# Patient Record
Sex: MALE | Race: WHITE | NOT HISPANIC OR LATINO | Employment: OTHER | URBAN - NONMETROPOLITAN AREA
[De-identification: names, ages, dates, MRNs, and addresses within clinical notes are randomized per-mention and may not be internally consistent; named-entity substitution may affect disease eponyms.]

---

## 2021-10-03 ENCOUNTER — APPOINTMENT (OUTPATIENT)
Dept: CT IMAGING | Facility: HOSPITAL | Age: 61
End: 2021-10-03

## 2021-10-03 ENCOUNTER — APPOINTMENT (OUTPATIENT)
Dept: GENERAL RADIOLOGY | Facility: HOSPITAL | Age: 61
End: 2021-10-03

## 2021-10-03 ENCOUNTER — HOSPITAL ENCOUNTER (OUTPATIENT)
Facility: HOSPITAL | Age: 61
Setting detail: OBSERVATION
Discharge: LEFT BEFORE DISCHARGE | End: 2021-10-04
Attending: FAMILY MEDICINE | Admitting: INTERNAL MEDICINE

## 2021-10-03 DIAGNOSIS — G45.9 TIA (TRANSIENT ISCHEMIC ATTACK): ICD-10-CM

## 2021-10-03 DIAGNOSIS — I95.89 HYPOTENSION DUE TO HYPOVOLEMIA: Primary | ICD-10-CM

## 2021-10-03 DIAGNOSIS — E86.1 HYPOTENSION DUE TO HYPOVOLEMIA: Primary | ICD-10-CM

## 2021-10-03 LAB
ALBUMIN SERPL-MCNC: 4.2 G/DL (ref 3.5–5.2)
ALBUMIN/GLOB SERPL: 1.4 G/DL
ALP SERPL-CCNC: 65 U/L (ref 39–117)
ALT SERPL W P-5'-P-CCNC: 32 U/L (ref 1–41)
AMPHET+METHAMPHET UR QL: NEGATIVE
AMPHETAMINES UR QL: POSITIVE
ANION GAP SERPL CALCULATED.3IONS-SCNC: 11 MMOL/L (ref 5–15)
AST SERPL-CCNC: 39 U/L (ref 1–40)
BARBITURATES UR QL SCN: NEGATIVE
BASOPHILS # BLD AUTO: 0.04 10*3/MM3 (ref 0–0.2)
BASOPHILS NFR BLD AUTO: 0.4 % (ref 0–1.5)
BENZODIAZ UR QL SCN: NEGATIVE
BILIRUB SERPL-MCNC: 0.5 MG/DL (ref 0–1.2)
BILIRUB UR QL STRIP: NEGATIVE
BUN SERPL-MCNC: 26 MG/DL (ref 8–23)
BUN/CREAT SERPL: 17.6 (ref 7–25)
BUPRENORPHINE SERPL-MCNC: NEGATIVE NG/ML
CALCIUM SPEC-SCNC: 10 MG/DL (ref 8.6–10.5)
CANNABINOIDS SERPL QL: POSITIVE
CHLORIDE SERPL-SCNC: 102 MMOL/L (ref 98–107)
CHOLEST SERPL-MCNC: 144 MG/DL (ref 0–200)
CLARITY UR: CLEAR
CO2 SERPL-SCNC: 24 MMOL/L (ref 22–29)
COCAINE UR QL: NEGATIVE
COLOR UR: YELLOW
CREAT SERPL-MCNC: 1.48 MG/DL (ref 0.76–1.27)
DEPRECATED RDW RBC AUTO: 54.4 FL (ref 37–54)
EOSINOPHIL # BLD AUTO: 0.23 10*3/MM3 (ref 0–0.4)
EOSINOPHIL NFR BLD AUTO: 2.1 % (ref 0.3–6.2)
ERYTHROCYTE [DISTWIDTH] IN BLOOD BY AUTOMATED COUNT: 16.9 % (ref 12.3–15.4)
ETHANOL BLD-MCNC: <10 MG/DL (ref 0–10)
ETHANOL UR QL: <0.01 %
FLUAV SUBTYP SPEC NAA+PROBE: NOT DETECTED
FLUBV RNA ISLT QL NAA+PROBE: NOT DETECTED
GFR SERPL CREATININE-BSD FRML MDRD: 48 ML/MIN/1.73
GLOBULIN UR ELPH-MCNC: 3 GM/DL
GLUCOSE SERPL-MCNC: 106 MG/DL (ref 65–99)
GLUCOSE UR STRIP-MCNC: NEGATIVE MG/DL
HCT VFR BLD AUTO: 37.8 % (ref 37.5–51)
HDLC SERPL-MCNC: 39 MG/DL (ref 40–60)
HGB BLD-MCNC: 12.5 G/DL (ref 13–17.7)
HGB UR QL STRIP.AUTO: NEGATIVE
HOLD SPECIMEN: NORMAL
HOLD SPECIMEN: NORMAL
IMM GRANULOCYTES # BLD AUTO: 0.19 10*3/MM3 (ref 0–0.05)
IMM GRANULOCYTES NFR BLD AUTO: 1.7 % (ref 0–0.5)
INR PPP: 1.13 (ref 0.8–1.2)
KETONES UR QL STRIP: NEGATIVE
LDLC SERPL CALC-MCNC: 91 MG/DL (ref 0–100)
LDLC/HDLC SERPL: 2.35 {RATIO}
LEUKOCYTE ESTERASE UR QL STRIP.AUTO: NEGATIVE
LYMPHOCYTES # BLD AUTO: 1.09 10*3/MM3 (ref 0.7–3.1)
LYMPHOCYTES NFR BLD AUTO: 9.7 % (ref 19.6–45.3)
MCH RBC QN AUTO: 29 PG (ref 26.6–33)
MCHC RBC AUTO-ENTMCNC: 33.1 G/DL (ref 31.5–35.7)
MCV RBC AUTO: 87.7 FL (ref 79–97)
METHADONE UR QL SCN: NEGATIVE
MONOCYTES # BLD AUTO: 1.1 10*3/MM3 (ref 0.1–0.9)
MONOCYTES NFR BLD AUTO: 9.8 % (ref 5–12)
NEUTROPHILS NFR BLD AUTO: 76.3 % (ref 42.7–76)
NEUTROPHILS NFR BLD AUTO: 8.54 10*3/MM3 (ref 1.7–7)
NITRITE UR QL STRIP: NEGATIVE
NRBC BLD AUTO-RTO: 0 /100 WBC (ref 0–0.2)
OPIATES UR QL: NEGATIVE
OXYCODONE UR QL SCN: NEGATIVE
PCP UR QL SCN: NEGATIVE
PH UR STRIP.AUTO: 6.5 [PH] (ref 5–9)
PLATELET # BLD AUTO: 229 10*3/MM3 (ref 140–450)
PMV BLD AUTO: 9 FL (ref 6–12)
POTASSIUM SERPL-SCNC: 4.3 MMOL/L (ref 3.5–5.2)
PROPOXYPH UR QL: NEGATIVE
PROT SERPL-MCNC: 7.2 G/DL (ref 6–8.5)
PROT UR QL STRIP: NEGATIVE
PROTHROMBIN TIME: 14.4 SECONDS (ref 11.1–15.3)
RBC # BLD AUTO: 4.31 10*6/MM3 (ref 4.14–5.8)
SARS-COV-2 RNA PNL SPEC NAA+PROBE: NOT DETECTED
SODIUM SERPL-SCNC: 137 MMOL/L (ref 136–145)
SP GR UR STRIP: 1.02 (ref 1–1.03)
TRICYCLICS UR QL SCN: NEGATIVE
TRIGL SERPL-MCNC: 67 MG/DL (ref 0–150)
UROBILINOGEN UR QL STRIP: NORMAL
VLDLC SERPL-MCNC: 14 MG/DL (ref 5–40)
WBC # BLD AUTO: 11.19 10*3/MM3 (ref 3.4–10.8)
WHOLE BLOOD HOLD SPECIMEN: NORMAL
WHOLE BLOOD HOLD SPECIMEN: NORMAL

## 2021-10-03 PROCEDURE — 71045 X-RAY EXAM CHEST 1 VIEW: CPT

## 2021-10-03 PROCEDURE — G0378 HOSPITAL OBSERVATION PER HR: HCPCS

## 2021-10-03 PROCEDURE — 82077 ASSAY SPEC XCP UR&BREATH IA: CPT | Performed by: FAMILY MEDICINE

## 2021-10-03 PROCEDURE — 96361 HYDRATE IV INFUSION ADD-ON: CPT

## 2021-10-03 PROCEDURE — 96360 HYDRATION IV INFUSION INIT: CPT

## 2021-10-03 PROCEDURE — 0 IOPAMIDOL PER 1 ML: Performed by: FAMILY MEDICINE

## 2021-10-03 PROCEDURE — C9803 HOPD COVID-19 SPEC COLLECT: HCPCS

## 2021-10-03 PROCEDURE — 93010 ELECTROCARDIOGRAM REPORT: CPT | Performed by: INTERNAL MEDICINE

## 2021-10-03 PROCEDURE — 80306 DRUG TEST PRSMV INSTRMNT: CPT | Performed by: FAMILY MEDICINE

## 2021-10-03 PROCEDURE — 70498 CT ANGIOGRAPHY NECK: CPT

## 2021-10-03 PROCEDURE — 70450 CT HEAD/BRAIN W/O DYE: CPT

## 2021-10-03 PROCEDURE — 83036 HEMOGLOBIN GLYCOSYLATED A1C: CPT | Performed by: NURSE PRACTITIONER

## 2021-10-03 PROCEDURE — 80061 LIPID PANEL: CPT | Performed by: INTERNAL MEDICINE

## 2021-10-03 PROCEDURE — 96372 THER/PROPH/DIAG INJ SC/IM: CPT

## 2021-10-03 PROCEDURE — 87636 SARSCOV2 & INF A&B AMP PRB: CPT | Performed by: FAMILY MEDICINE

## 2021-10-03 PROCEDURE — 85025 COMPLETE CBC W/AUTO DIFF WBC: CPT | Performed by: FAMILY MEDICINE

## 2021-10-03 PROCEDURE — 99284 EMERGENCY DEPT VISIT MOD MDM: CPT

## 2021-10-03 PROCEDURE — 93005 ELECTROCARDIOGRAM TRACING: CPT | Performed by: FAMILY MEDICINE

## 2021-10-03 PROCEDURE — 25010000002 HEPARIN (PORCINE) PER 1000 UNITS: Performed by: INTERNAL MEDICINE

## 2021-10-03 PROCEDURE — 85610 PROTHROMBIN TIME: CPT | Performed by: FAMILY MEDICINE

## 2021-10-03 PROCEDURE — 80053 COMPREHEN METABOLIC PANEL: CPT | Performed by: FAMILY MEDICINE

## 2021-10-03 PROCEDURE — 70496 CT ANGIOGRAPHY HEAD: CPT

## 2021-10-03 PROCEDURE — 81003 URINALYSIS AUTO W/O SCOPE: CPT | Performed by: FAMILY MEDICINE

## 2021-10-03 RX ORDER — SODIUM CHLORIDE 0.9 % (FLUSH) 0.9 %
10 SYRINGE (ML) INJECTION EVERY 12 HOURS SCHEDULED
Status: DISCONTINUED | OUTPATIENT
Start: 2021-10-03 | End: 2021-10-04 | Stop reason: HOSPADM

## 2021-10-03 RX ORDER — SODIUM CHLORIDE 0.9 % (FLUSH) 0.9 %
10 SYRINGE (ML) INJECTION AS NEEDED
Status: DISCONTINUED | OUTPATIENT
Start: 2021-10-03 | End: 2021-10-04 | Stop reason: HOSPADM

## 2021-10-03 RX ORDER — ATORVASTATIN CALCIUM 10 MG/1
10 TABLET, FILM COATED ORAL NIGHTLY
COMMUNITY

## 2021-10-03 RX ORDER — ASPIRIN 81 MG/1
324 TABLET, CHEWABLE ORAL DAILY
Status: DISCONTINUED | OUTPATIENT
Start: 2021-10-04 | End: 2021-10-03

## 2021-10-03 RX ORDER — BUPROPION HYDROCHLORIDE 150 MG/1
150 TABLET, EXTENDED RELEASE ORAL 2 TIMES DAILY
Status: DISCONTINUED | OUTPATIENT
Start: 2021-10-04 | End: 2021-10-04 | Stop reason: HOSPADM

## 2021-10-03 RX ORDER — ONDANSETRON 2 MG/ML
4 INJECTION INTRAMUSCULAR; INTRAVENOUS EVERY 6 HOURS PRN
Status: DISCONTINUED | OUTPATIENT
Start: 2021-10-03 | End: 2021-10-04 | Stop reason: HOSPADM

## 2021-10-03 RX ORDER — ASPIRIN 81 MG/1
81 TABLET, CHEWABLE ORAL DAILY
COMMUNITY

## 2021-10-03 RX ORDER — SODIUM CHLORIDE 9 MG/ML
INJECTION, SOLUTION INTRAVENOUS
Status: COMPLETED
Start: 2021-10-03 | End: 2021-10-03

## 2021-10-03 RX ORDER — CLOPIDOGREL BISULFATE 75 MG/1
75 TABLET ORAL DAILY
Status: DISCONTINUED | OUTPATIENT
Start: 2021-10-04 | End: 2021-10-04 | Stop reason: HOSPADM

## 2021-10-03 RX ORDER — IPRATROPIUM BROMIDE AND ALBUTEROL SULFATE 2.5; .5 MG/3ML; MG/3ML
3 SOLUTION RESPIRATORY (INHALATION) EVERY 6 HOURS PRN
Status: DISCONTINUED | OUTPATIENT
Start: 2021-10-03 | End: 2021-10-04 | Stop reason: HOSPADM

## 2021-10-03 RX ORDER — ACETAMINOPHEN 650 MG/1
650 SUPPOSITORY RECTAL EVERY 4 HOURS PRN
Status: DISCONTINUED | OUTPATIENT
Start: 2021-10-03 | End: 2021-10-04 | Stop reason: HOSPADM

## 2021-10-03 RX ORDER — HEPARIN SODIUM 5000 [USP'U]/ML
5000 INJECTION, SOLUTION INTRAVENOUS; SUBCUTANEOUS EVERY 12 HOURS SCHEDULED
Status: DISCONTINUED | OUTPATIENT
Start: 2021-10-03 | End: 2021-10-04 | Stop reason: HOSPADM

## 2021-10-03 RX ORDER — ATORVASTATIN CALCIUM 40 MG/1
40 TABLET, FILM COATED ORAL NIGHTLY
Status: DISCONTINUED | OUTPATIENT
Start: 2021-10-04 | End: 2021-10-04 | Stop reason: HOSPADM

## 2021-10-03 RX ORDER — CLOPIDOGREL BISULFATE 75 MG/1
75 TABLET ORAL DAILY
COMMUNITY

## 2021-10-03 RX ORDER — BUPROPION HYDROCHLORIDE 150 MG/1
150 TABLET, EXTENDED RELEASE ORAL 2 TIMES DAILY
COMMUNITY

## 2021-10-03 RX ORDER — GABAPENTIN 300 MG/1
300 CAPSULE ORAL 3 TIMES DAILY
COMMUNITY

## 2021-10-03 RX ORDER — ONDANSETRON 4 MG/1
4 TABLET, FILM COATED ORAL EVERY 6 HOURS PRN
Status: DISCONTINUED | OUTPATIENT
Start: 2021-10-03 | End: 2021-10-04 | Stop reason: HOSPADM

## 2021-10-03 RX ORDER — GABAPENTIN 300 MG/1
300 CAPSULE ORAL 3 TIMES DAILY
Status: DISCONTINUED | OUTPATIENT
Start: 2021-10-04 | End: 2021-10-04 | Stop reason: HOSPADM

## 2021-10-03 RX ORDER — ASPIRIN 81 MG/1
81 TABLET, CHEWABLE ORAL DAILY
Status: DISCONTINUED | OUTPATIENT
Start: 2021-10-04 | End: 2021-10-04 | Stop reason: HOSPADM

## 2021-10-03 RX ORDER — ACETAMINOPHEN 160 MG/5ML
650 SOLUTION ORAL EVERY 4 HOURS PRN
Status: DISCONTINUED | OUTPATIENT
Start: 2021-10-03 | End: 2021-10-04 | Stop reason: HOSPADM

## 2021-10-03 RX ORDER — LISINOPRIL 10 MG/1
10 TABLET ORAL DAILY
COMMUNITY

## 2021-10-03 RX ORDER — SODIUM CHLORIDE 9 MG/ML
100 INJECTION, SOLUTION INTRAVENOUS CONTINUOUS
Status: DISCONTINUED | OUTPATIENT
Start: 2021-10-03 | End: 2021-10-04 | Stop reason: HOSPADM

## 2021-10-03 RX ORDER — ACETAMINOPHEN 325 MG/1
650 TABLET ORAL EVERY 4 HOURS PRN
Status: DISCONTINUED | OUTPATIENT
Start: 2021-10-03 | End: 2021-10-04 | Stop reason: HOSPADM

## 2021-10-03 RX ADMIN — SODIUM CHLORIDE 1000 ML: 900 INJECTION, SOLUTION INTRAVENOUS at 16:41

## 2021-10-03 RX ADMIN — SODIUM CHLORIDE 100 ML/HR: 9 INJECTION, SOLUTION INTRAVENOUS at 20:27

## 2021-10-03 RX ADMIN — IOPAMIDOL 90 ML: 755 INJECTION, SOLUTION INTRAVENOUS at 16:52

## 2021-10-03 RX ADMIN — Medication 10 ML: at 20:21

## 2021-10-03 RX ADMIN — HEPARIN SODIUM 5000 UNITS: 5000 INJECTION INTRAVENOUS; SUBCUTANEOUS at 20:28

## 2021-10-03 NOTE — ED PROVIDER NOTES
Subjective     History provided by:  Patient   used: No    Patient is a 60 years old with past medical history of TIA.  He said that he had aTIA about a month ago.  Presented here today because of slurry speech.  Were not sure the last time known well.  Denies any extremity weakness.    Review of Systems   All other systems reviewed and are negative.      No past medical history on file.    No Known Allergies    No past surgical history on file.    No family history on file.    Social History     Socioeconomic History   • Marital status:      Spouse name: Not on file   • Number of children: Not on file   • Years of education: Not on file   • Highest education level: Not on file           Objective   Physical Exam  Vitals and nursing note reviewed.   Constitutional:       Appearance: Normal appearance. He is normal weight.   HENT:      Head: Normocephalic and atraumatic.      Right Ear: Tympanic membrane and ear canal normal.      Left Ear: Tympanic membrane, ear canal and external ear normal.      Nose: Nose normal.   Eyes:      Extraocular Movements: Extraocular movements intact.      Conjunctiva/sclera: Conjunctivae normal.      Pupils: Pupils are equal, round, and reactive to light.   Cardiovascular:      Rate and Rhythm: Normal rate and regular rhythm.      Pulses: Normal pulses.      Heart sounds: Normal heart sounds.   Pulmonary:      Breath sounds: Normal breath sounds.   Abdominal:      General: Abdomen is flat. Bowel sounds are normal.      Palpations: Abdomen is soft.   Musculoskeletal:         General: Normal range of motion.      Cervical back: Normal range of motion and neck supple.   Skin:     General: Skin is warm.      Capillary Refill: Capillary refill takes less than 2 seconds.   Neurological:      General: No focal deficit present.      Mental Status: He is alert and oriented to person, place, and time.      GCS: GCS eye subscore is 4. GCS verbal subscore is 5. GCS motor  subscore is 6.      Coordination: Coordination is intact.      Gait: Gait is intact.      Comments: Right facial weakness   Psychiatric:         Mood and Affect: Mood normal.         Behavior: Behavior normal.         Procedures           ED Course  ED Course as of Oct 03 1834   Sun Oct 03, 2021   1710 Spoke to Dr. Mohsen said that he will consult on patient.    [MO]   1833 Spoke to Dr. johnson who accepted patient.    [MO]   1834 All the symptom has resolved.    [MO]      ED Course User Index  [MO] Abdoulaye Mallory MD                                         NIHSS (NIH Stroke Scale/Score) reviewed and/or performed as part of the patient evaluation and treatment planning process.  The result associated with this review/performance is: 3       MDM    Final diagnoses:   Hypotension due to hypovolemia   TIA (transient ischemic attack)       ED Disposition  ED Disposition     ED Disposition Condition Comment    Decision to Admit  Level of Care: Stepdown [25]   Diagnosis: Hypotension due to hypovolemia [5139820]   Admitting Physician: ALLEGRA JOHNSON [[id number]]   Attending Physician: ALLEGRA JOHNSON [[id number]]            No follow-up provider specified.       Medication List      No changes were made to your prescriptions during this visit.          Abdoulaye Mallory MD  10/03/21 3023

## 2021-10-04 ENCOUNTER — APPOINTMENT (OUTPATIENT)
Dept: MRI IMAGING | Facility: HOSPITAL | Age: 61
End: 2021-10-04

## 2021-10-04 VITALS
BODY MASS INDEX: 19.26 KG/M2 | DIASTOLIC BLOOD PRESSURE: 86 MMHG | HEART RATE: 70 BPM | HEIGHT: 68 IN | SYSTOLIC BLOOD PRESSURE: 133 MMHG | TEMPERATURE: 97.3 F | OXYGEN SATURATION: 96 % | WEIGHT: 127.1 LBS | RESPIRATION RATE: 18 BRPM

## 2021-10-04 LAB
HBA1C MFR BLD: 4.7 % (ref 4.8–5.6)
QT INTERVAL: 410 MS
QTC INTERVAL: 470 MS

## 2021-10-04 PROCEDURE — G0378 HOSPITAL OBSERVATION PER HR: HCPCS

## 2021-10-04 PROCEDURE — 96361 HYDRATE IV INFUSION ADD-ON: CPT

## 2021-10-04 NOTE — DISCHARGE SUMMARY
[facility] Medicine Services  DISCHARGE SUMMARY       Date of Admission: 10/3/2021  Date of Discharge:  10/4/2021  Primary Care Physician: Provider, No Known    Presenting Problem/History of Present Illness:  Hypotension due to hypovolemia [I95.89, E86.1]   Slurred speech.    Final Discharge Diagnoses:  Active Hospital Problems    Diagnosis    • Hypotension due to hypovolemia    Possible CVA.    Consults:   Consults     No orders found for last 30 day(s).          Procedures Performed: None.                Pertinent Test Results:   Lab Results (last 7 days)     Procedure Component Value Units Date/Time    Hemoglobin A1c [[id number]]  (Abnormal) Collected: 10/03/21 1642    Specimen: Blood Updated: 10/04/21 0743     Hemoglobin A1C 4.70 %     Narrative:      Hemoglobin A1C Ranges:    Increased Risk for Diabetes  5.7% to 6.4%  Diabetes                     >= 6.5%  Diabetic Goal                < 7.0%    Urine Drug Screen - Urine, Clean Catch [[id number]]  (Abnormal) Collected: 10/03/21 1911    Specimen: Urine, Clean Catch Updated: 10/03/21 1927     THC, Screen, Urine Positive     Phencyclidine (PCP), Urine Negative     Cocaine Screen, Urine Negative     Methamphetamine, Ur Positive     Opiate Screen Negative     Amphetamine Screen, Urine Negative     Benzodiazepine Screen, Urine Negative     Tricyclic Antidepressants Screen Negative     Methadone Screen, Urine Negative     Barbiturates Screen, Urine Negative     Oxycodone Screen, Urine Negative     Propoxyphene Screen Negative     Buprenorphine, Screen, Urine Negative    Narrative:      Cutoff For Drugs Screened:    Amphetamines               500 ng/ml  Barbiturates               200 ng/ml  Benzodiazepines            150 ng/ml  Cocaine                    150 ng/ml  Methadone                  200 ng/ml  Opiates                    100 ng/ml  Phencyclidine               25 ng/ml  THC                            50  ng/ml  Methamphetamine            500 ng/ml  Tricyclic Antidepressants  300 ng/ml  Oxycodone                  100 ng/ml  Propoxyphene               300 ng/ml  Buprenorphine               10 ng/ml    The normal value for all drugs tested is negative. This report includes unconfirmed screening results, with the cutoff values listed, to be used for medical treatment purposes only.  Unconfirmed results must not be used for non-medical purposes such as employment or legal testing.  Clinical consideration should be applied to any drug of abuse test, particularly when unconfirmed results are used.      Lipid Panel [[id number]]  (Abnormal) Collected: 10/03/21 1642    Specimen: Blood Updated: 10/03/21 1925     Total Cholesterol 144 mg/dL      Triglycerides 67 mg/dL      HDL Cholesterol 39 mg/dL      LDL Cholesterol  91 mg/dL      VLDL Cholesterol 14 mg/dL      LDL/HDL Ratio 2.35    Narrative:      Cholesterol Reference Ranges  (U.S. Department of Health and Human Services ATP III Classifications)    Desirable          <200 mg/dL  Borderline High    200-239 mg/dL  High Risk          >240 mg/dL      Triglyceride Reference Ranges  (U.S. Department of Health and Human Services ATP III Classifications)    Normal           <150 mg/dL  Borderline High  150-199 mg/dL  High             200-499 mg/dL  Very High        >500 mg/dL    HDL Reference Ranges  (U.S. Department of Health and Human Services ATP III Classifcations)    Low     <40 mg/dl (major risk factor for CHD)  High    >60 mg/dl ('negative' risk factor for CHD)        LDL Reference Ranges  (U.S. Department of Health and Human Services ATP III Classifcations)    Optimal          <100 mg/dL  Near Optimal     100-129 mg/dL  Borderline High  130-159 mg/dL  High             160-189 mg/dL  Very High        >189 mg/dL    Urinalysis With Microscopic If Indicated (No Culture) - Urine, Clean Catch [[id number]]  (Normal) Collected: 10/03/21 1911    Specimen: Urine, Clean Catch Updated:  10/03/21 1920     Color, UA Yellow     Appearance, UA Clear     pH, UA 6.5     Specific Gravity, UA 1.025     Glucose, UA Negative     Ketones, UA Negative     Bilirubin, UA Negative     Blood, UA Negative     Protein, UA Negative     Leuk Esterase, UA Negative     Nitrite, UA Negative     Urobilinogen, UA 0.2 E.U./dL    Narrative:      Urine microscopic not indicated.    COVID-19 and FLU A/B PCR - Swab, Nasopharynx [[id number]]  (Normal) Collected: 10/03/21 1831    Specimen: Swab from Nasopharynx Updated: 10/03/21 1904     COVID19 Not Detected     Influenza A PCR Not Detected     Influenza B PCR Not Detected    Narrative:      Fact sheet for providers: https://www.fda.gov/media/958134/download    Fact sheet for patients: https://www.fda.gov/media/887674/download    Test performed by PCR.    Gary Draw [[id number]] Collected: 10/03/21 1642    Specimen: Blood Updated: 10/03/21 1746    Narrative:      The following orders were created for panel order Gary Draw.  Procedure                               Abnormality         Status                     ---------                               -----------         ------                     Green Top (Gel)[[id number]]                                  Final result               Lavender Top[[id number]]                                     Final result               Gold Top - SST[[id number]]                                   Final result               Light Blue Top[[id number]]                                   Final result                 Please view results for these tests on the individual orders.    Lavender Top [[id number]] Collected: 10/03/21 1642    Specimen: Blood Updated: 10/03/21 1746     Extra Tube hold for add-on     Comment: Auto resulted       Light Blue Top [[id number]] Collected: 10/03/21 1642    Specimen: Blood Updated: 10/03/21 1746     Extra Tube hold for add-on     Comment: Auto resulted       Green Top (Gel) [[id number]] Collected: 10/03/21 1642    Specimen:  Blood Updated: 10/03/21 1746     Extra Tube Hold for add-ons.     Comment: Auto resulted.       Gold Top - SST [[id number]] Collected: 10/03/21 1642    Specimen: Blood Updated: 10/03/21 1746     Extra Tube Hold for add-ons.     Comment: Auto resulted.       Ethanol [[id number]] Collected: 10/03/21 1642    Specimen: Blood Updated: 10/03/21 1709     Ethanol <10 mg/dL      Ethanol % <0.010 %     Comprehensive Metabolic Panel [[id number]]  (Abnormal) Collected: 10/03/21 1642    Specimen: Blood Updated: 10/03/21 1703     Glucose 106 mg/dL      BUN 26 mg/dL      Creatinine 1.48 mg/dL      Sodium 137 mmol/L      Potassium 4.3 mmol/L      Chloride 102 mmol/L      CO2 24.0 mmol/L      Calcium 10.0 mg/dL      Total Protein 7.2 g/dL      Albumin 4.20 g/dL      ALT (SGPT) 32 U/L      AST (SGOT) 39 U/L      Alkaline Phosphatase 65 U/L      Total Bilirubin 0.5 mg/dL      eGFR Non African Amer 48 mL/min/1.73      Globulin 3.0 gm/dL      A/G Ratio 1.4 g/dL      BUN/Creatinine Ratio 17.6     Anion Gap 11.0 mmol/L     Narrative:      GFR Normal >60  Chronic Kidney Disease <60  Kidney Failure <15      Protime-INR [[id number]]  (Normal) Collected: 10/03/21 1642    Specimen: Blood Updated: 10/03/21 1703     Protime 14.4 Seconds      INR 1.13    Narrative:      Therapeutic range for most indications is 2.0-3.0 INR,  or 2.5-3.5 for mechanical heart valves.    CBC & Differential [[id number]]  (Abnormal) Collected: 10/03/21 1642    Specimen: Blood Updated: 10/03/21 1647    Narrative:      The following orders were created for panel order CBC & Differential.  Procedure                               Abnormality         Status                     ---------                               -----------         ------                     CBC Auto Differential[[id number]]        Abnormal            Final result                 Please view results for these tests on the individual orders.    CBC Auto Differential [[id number]]  (Abnormal) Collected: 10/03/21  1642    Specimen: Blood Updated: 10/03/21 1647     WBC 11.19 10*3/mm3      RBC 4.31 10*6/mm3      Hemoglobin 12.5 g/dL      Hematocrit 37.8 %      MCV 87.7 fL      MCH 29.0 pg      MCHC 33.1 g/dL      RDW 16.9 %      RDW-SD 54.4 fl      MPV 9.0 fL      Platelets 229 10*3/mm3      Neutrophil % 76.3 %      Lymphocyte % 9.7 %      Monocyte % 9.8 %      Eosinophil % 2.1 %      Basophil % 0.4 %      Immature Grans % 1.7 %      Neutrophils, Absolute 8.54 10*3/mm3      Lymphocytes, Absolute 1.09 10*3/mm3      Monocytes, Absolute 1.10 10*3/mm3      Eosinophils, Absolute 0.23 10*3/mm3      Basophils, Absolute 0.04 10*3/mm3      Immature Grans, Absolute 0.19 10*3/mm3      nRBC 0.0 /100 WBC         Imaging Results (Last 7 Days)     Procedure Component Value Units Date/Time    CT Angiogram Head w AI Analysis of LVO [[id number]] Collected: 10/03/21 1647     Updated: 10/04/21 0117    Addenda:         ADDENDUM   ADDENDUM #1       Results were discussed with Dr. Abdoulaye Mallory by telephone on  October 3, 2021 at 6:19 PM CDT.    Electronically signed by:  Albaro Mccall MD  10/4/2021 1:16 AM CDT  Workstation: [id number]    Signed: 10/04/21 0116 by Albaro Mccall MD    Narrative:      HISTORY:  Stroke, follow up    TECHNIQUE: Postcontrast angiographic imaging with 3-D  postprocessing imaging and multiplanar reformatted images of the  head and neck was performed utilizing 90 mL Isovue-370  intravenously.  NASCET criteria utilized.    CT angiography data was processed via RAPID LVO AI technology to  assist stroke clinical decision making with regards to the  diagnosis of large vessel occlusion.    This exam was performed according to our departmental  dose-optimization program, which includes automated exposure  control, adjustment of the mA and/or kV according to patient size  and/or use of iterative reconstruction technique.    COMPARISON: Examination is correlated with noncontrast head CT  performed immediately prior to this  study.    FINDINGS:      CTA HEAD: There is mild atherosclerotic plaque within the  bilateral internal carotid arteries and bilateral vertebral  arteries without significant stenosis. There is no evidence of  significant intracranial arterial stenosis or aneurysm.  There is  normal enhancement of the dural venous sinuses.    CTA NECK: Normal-appearing aortic arch anatomy is noted.  The  origins of the great vessels are widely patent. Atheromatous  plaque formation is noted within the bilateral carotid bulbs and  proximal internal carotid arteries, mild to moderate in severity  and slightly greater on the left. Vertebral arteries are  codominant.  No discrete neck mass is identified. There is mild  biapical subpleural scarring.      Impression:      1.  No significant intracranial arterial stenosis or occlusion.  2.  Atheromatous plaque formation within the bilateral carotid  bulbs and proximal internal carotid arteries, mild to moderate in  severity and slightly greater on the left.    Electronically signed by:  Albaro Mccall MD  10/3/2021 6:18 PM CDT  Workstation: [id number]    CT Angiogram Neck [[id number]] Collected: 10/03/21 1647     Updated: 10/04/21 0117    Addenda:         ADDENDUM   ADDENDUM #1       Results were discussed with Dr. Abdoulaye Mallory by telephone on  October 3, 2021 at 6:19 PM CDT.    Electronically signed by:  Albaro Mccall MD  10/4/2021 1:16 AM CDT  Workstation: [id number]    Signed: 10/04/21 0116 by Albaro Mccall MD    Narrative:      HISTORY:  Stroke, follow up    TECHNIQUE: Postcontrast angiographic imaging with 3-D  postprocessing imaging and multiplanar reformatted images of the  head and neck was performed utilizing 90 mL Isovue-370  intravenously.  NASCET criteria utilized.    CT angiography data was processed via [employer]ID [employer] technology to  assist stroke clinical decision making with regards to the  diagnosis of large vessel occlusion.    This exam was performed according to our  departmental  dose-optimization program, which includes automated exposure  control, adjustment of the mA and/or kV according to patient size  and/or use of iterative reconstruction technique.    COMPARISON: Examination is correlated with noncontrast head CT  performed immediately prior to this study.    FINDINGS:      CTA HEAD: There is mild atherosclerotic plaque within the  bilateral internal carotid arteries and bilateral vertebral  arteries without significant stenosis. There is no evidence of  significant intracranial arterial stenosis or aneurysm.  There is  normal enhancement of the dural venous sinuses.    CTA NECK: Normal-appearing aortic arch anatomy is noted.  The  origins of the great vessels are widely patent. Atheromatous  plaque formation is noted within the bilateral carotid bulbs and  proximal internal carotid arteries, mild to moderate in severity  and slightly greater on the left. Vertebral arteries are  codominant.  No discrete neck mass is identified. There is mild  biapical subpleural scarring.      Impression:      1.  No significant intracranial arterial stenosis or occlusion.  2.  Atheromatous plaque formation within the bilateral carotid  bulbs and proximal internal carotid arteries, mild to moderate in  severity and slightly greater on the left.    Electronically signed by:  Albaro Mccall MD  10/3/2021 6:18 PM CDT  Workstation: [id number]    XR Chest 1 View [[id number]] Collected: 10/03/21 1700     Updated: 10/03/21 1731    Narrative:        PORTABLE CHEST    HISTORY: Stroke protocol onset greater than 12 hours. Speech  problem.    Portable AP upright film of the chest was obtained at 4:44 PM.  COMPARISON: None    FINDINGS:   EKG leads.  Old granulomatous disease is present.  Chronic obstructive pulmonary disease.  Minimal cardiomegaly with minimal congestive changes.  No pleural effusion.  No pneumothorax.  No acute osseous abnormality.  Degenerative changes are present in the thoracic  spine.      Impression:      CONCLUSION:  Chronic obstructive pulmonary disease.  Minimal cardiomegaly with minimal congestive changes.    [id number]    Electronically signed by:  Johnny Alvarado MD  10/3/2021 5:29 PM CDT  Workstation: [id number]    CT Head Without Contrast Stroke Protocol [[id number]] Collected: 10/03/21 1640     Updated: 10/03/21 1706    Narrative:        CT Head Without Contrast    History: Stroke    Axial scans of the brain were obtained without intravenous  contrast.  Coronal and sagital reconstructions were preformed.    This exam was performed according to our departmental  dose-optimization program, which includes automated exposure  control, adjustment of the mA and/or kV according to patient size  and/or use of iterative reconstruction technique.    DLP: 908.20    Comparison: None    Findings:  The patient is edentulous.  The visualized paranasal sinuses are unremarkable.    No acute process.  Cerebral and cerebellar atrophy.  No hemorrhage.  No mass.  No abnormal areas of increased or decreased attenuation.  No midline shift.  No abnormal extra-axial fluid collections.      Impression:      CONCLUSION:  No acute process.  Cerebral and cerebellar atrophy.    [id number]    Electronically signed by:  Johnny Alvarado MD  10/3/2021 5:04 PM CDT  Workstation: [id number]            Chief Complaint on Day of Discharge: Not applicable as patient left AGAINST MEDICAL ADVICE.    Hospital Course:  Patient was admitted for TIA versus possible CVA.  His symptoms did resolve prior to admission.  He was started on DAPT, high intensity statin, neurochecks with PT, OT and speech therapy consults. Echocardiogram, bilateral carotid ultrasound and MRI were ordered and neurology is consulted.    Patient also had hypotension which resolved with IV resuscitation.  His creatinine was slightly elevated but unknown whether this is acute or chronic.  Patient however reportedly signed out AGAINST MEDICAL ADVICE in the early hours of  "10/4/2021.     Condition on Discharge: Not applicable.    Physical Exam on Discharge:  /86 (BP Location: Right arm, Patient Position: Sitting)   Pulse 70   Temp 97.3 °F (36.3 °C) (Oral)   Resp 18   Ht 172.7 cm (68\")   Wt 57.7 kg (127 lb 1.6 oz)   SpO2 96%   BMI 19.33 kg/m²   Physical Exam  Not applicable.    Discharge Disposition:  Left Against Medical Advice    Discharge Medications:     Discharge Medications      ASK your doctor about these medications      Instructions Start Date   aspirin 81 MG chewable tablet   81 mg, Oral, Daily      atorvastatin 10 MG tablet  Commonly known as: LIPITOR   10 mg, Oral, Nightly      buPROPion  MG 12 hr tablet  Commonly known as: WELLBUTRIN SR   150 mg, Oral, 2 Times Daily      clopidogrel 75 MG tablet  Commonly known as: PLAVIX   75 mg, Oral, Daily      gabapentin 300 MG capsule  Commonly known as: NEURONTIN   300 mg, Oral, 3 Times Daily      lisinopril 10 MG tablet  Commonly known as: PRINIVIL,ZESTRIL   10 mg, Oral, Daily      metoprolol tartrate 25 MG tablet  Commonly known as: LOPRESSOR   25 mg, Oral, 2 Times Daily             Discharge Diet: Not applicable.    Activity at Discharge: Not applicable.     Discharge Care Plan/Instructions: Not applicable.    Follow-up Appointments:   No future appointments.    Test Results Pending at Discharge:       Kenny Covington MD  10/04/21  14:34 CDT    Time: 20 minutes.              "

## 2021-10-04 NOTE — H&P
[facility] Medicine Services  INPATIENT HISTORY AND PHYSICAL       Patient Care Team:  Provider, No Known as PCP - General    Date of Admission: 10/3/2021    Primary Care Physician: Provider, No Known    Chief complaint   Chief Complaint   Patient presents with   • Speech Problem       Subjective     Patient is a 60 y.o. 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  He has had intermittent slurring of speech the past several weeks but symptoms became worse today with increased severity.  He denies any focal weaknesses, facial droop, headaches, blurry vision, chest pain, shortness of air, fever, chills, nausea, vomiting, palpitation, diaphoresis, trauma, sphincteric disturbance, syncope or presyncope.    On triage, patient was noted to be hypotensive and had blood pressure of 53/35.  He received intravenous fluid bolus and blood pressure improved to 111/70.  Noncontrast CT scan did not show any acute changes, CT angiogram of head and neck showed atheromatous plaque formation within the bilateral carotid bulbs and proximal internal carotid arteries and EKG did not show any acute changes.  Patient symptom had resolved during my assessment.    Patient is visiting from the [facility] to attend the  service of his son who recently passed away from Covid.    Habits: Patient does admit to smoking about 1 pack of cigarettes a day and states that he is slowly cutting down.  He denies alcohol use.      Review of Systems   Constitutional: Positive for fatigue. Negative for activity change, appetite change, chills, diaphoresis and fever.   HENT: Negative for trouble swallowing and voice change.    Eyes: Negative for photophobia and visual disturbance.   Respiratory: Negative for cough, choking, chest tightness, shortness of breath, wheezing and stridor.    Cardiovascular: Negative for  chest pain, palpitations and leg swelling.   Gastrointestinal: Negative for abdominal distention, abdominal pain, blood in stool, constipation, diarrhea, nausea and vomiting.   Endocrine: Negative for cold intolerance, heat intolerance, polydipsia, polyphagia and polyuria.   Genitourinary: Negative for decreased urine volume, difficulty urinating, dysuria, enuresis, flank pain, frequency, hematuria and urgency.   Musculoskeletal: Negative for arthralgias, gait problem, myalgias, neck pain and neck stiffness.   Skin: Negative for pallor, rash and wound.   Neurological: Positive for speech difficulty. Negative for dizziness, tremors, seizures, syncope, facial asymmetry, weakness, light-headedness, numbness and headaches.   Hematological: Does not bruise/bleed easily.   Psychiatric/Behavioral: Negative for agitation, behavioral problems and confusion.         History: As dictated above.  No past medical history on file.  No past surgical history on file.  No family history on file.  Social History     Tobacco Use   • Smoking status: Not on file   Substance Use Topics   • Alcohol use: Not on file   • Drug use: Not on file     (Not in a hospital admission)    Allergies:  Patient has no known allergies.  Prior to Admission medications    Medication Sig Start Date End Date Taking? Authorizing Provider   aspirin 81 MG chewable tablet Chew 81 mg Daily.   Yes Marquis Burt MD   atorvastatin (LIPITOR) 10 MG tablet Take 10 mg by mouth Every Night.   Yes Marquis Burt MD   buPROPion SR (WELLBUTRIN SR) 150 MG 12 hr tablet Take 150 mg by mouth 2 (Two) Times a Day.   Yes Marquis Burt MD   clopidogrel (PLAVIX) 75 MG tablet Take 75 mg by mouth Daily.   Yes Marquis Burt MD   gabapentin (NEURONTIN) 300 MG capsule Take 300 mg by mouth 3 (Three) Times a Day.   Yes Marquis Burt MD   lisinopril (PRINIVIL,ZESTRIL) 10 MG tablet Take 10 mg by mouth Daily.   Yes Marquis Burt MD   metoprolol  tartrate (LOPRESSOR) 25 MG tablet Take 25 mg by mouth 2 (Two) Times a Day.   Yes Provider, MD Marquis       Objective        Vital Signs  Temp:  [97.3 °F (36.3 °C)] 97.3 °F (36.3 °C)  Heart Rate:  [] 76  Resp:  [16] 16  BP: ()/(35-72) 111/70      Physical Exam  Vitals and nursing note reviewed.   Constitutional:       General: He is not in acute distress.     Appearance: He is underweight. He is not diaphoretic.   HENT:      Head: Normocephalic and atraumatic.   Eyes:      General: No scleral icterus.     Extraocular Movements: Extraocular movements intact.      Pupils: Pupils are equal, round, and reactive to light.   Neck:      Thyroid: No thyromegaly.      Vascular: No JVD.   Cardiovascular:      Rate and Rhythm: Normal rate and regular rhythm.      Heart sounds: Normal heart sounds. No murmur heard.   No friction rub. No gallop.    Pulmonary:      Effort: Pulmonary effort is normal.      Breath sounds: Normal breath sounds. No wheezing or rales.   Chest:      Chest wall: No tenderness.   Abdominal:      General: Bowel sounds are normal. There is no distension.      Palpations: Abdomen is soft. There is no mass.      Tenderness: There is no abdominal tenderness. There is no right CVA tenderness, left CVA tenderness, guarding or rebound.   Musculoskeletal:         General: No swelling, tenderness or deformity.      Cervical back: Normal range of motion and neck supple.      Right lower leg: No edema.      Left lower leg: No edema.   Skin:     General: Skin is warm and dry.      Coloration: Skin is not jaundiced or pale.      Findings: No bruising, erythema, lesion or rash.   Neurological:      Mental Status: He is alert and oriented to person, place, and time.      Cranial Nerves: No cranial nerve deficit.      Sensory: No sensory deficit.      Motor: No weakness or abnormal muscle tone.      Coordination: Coordination normal.      Gait: Gait normal.      Deep Tendon Reflexes: Reflexes normal.    Psychiatric:         Mood and Affect: Mood normal.         Behavior: Behavior normal.         Thought Content: Thought content normal.         Judgment: Judgment normal.           Results Review:     Results from last 7 days   Lab Units 10/03/21  1642   SODIUM mmol/L 137   POTASSIUM mmol/L 4.3   CHLORIDE mmol/L 102   CO2 mmol/L 24.0   BUN mg/dL 26*   CREATININE mg/dL 1.48*   GLUCOSE mg/dL 106*   CALCIUM mg/dL 10.0   BILIRUBIN mg/dL 0.5   ALK PHOS U/L 65   ALT (SGPT) U/L 32   AST (SGOT) U/L 39             Results from last 7 days   Lab Units 10/03/21  1642   WBC 10*3/mm3 11.19*   HEMOGLOBIN g/dL 12.5*   HEMATOCRIT % 37.8   PLATELETS 10*3/mm3 229       Results from last 7 days   Lab Units 10/03/21  1642   INR  1.13     Imaging Results (Last 7 Days)     Procedure Component Value Units Date/Time    CT Angiogram Neck [[id number]] Collected: 10/03/21 1647     Updated: 10/03/21 1819    Narrative:      HISTORY:  Stroke, follow up    TECHNIQUE: Postcontrast angiographic imaging with 3-D  postprocessing imaging and multiplanar reformatted images of the  head and neck was performed utilizing 90 mL Isovue-370  intravenously.  NASCET criteria utilized.    CT angiography data was processed via RAPID LVO AI technology to  assist stroke clinical decision making with regards to the  diagnosis of large vessel occlusion.    This exam was performed according to our departmental  dose-optimization program, which includes automated exposure  control, adjustment of the mA and/or kV according to patient size  and/or use of iterative reconstruction technique.    COMPARISON: Examination is correlated with noncontrast head CT  performed immediately prior to this study.    FINDINGS:      CTA HEAD: There is mild atherosclerotic plaque within the  bilateral internal carotid arteries and bilateral vertebral  arteries without significant stenosis. There is no evidence of  significant intracranial arterial stenosis or aneurysm.  There is  normal  enhancement of the dural venous sinuses.    CTA NECK: Normal-appearing aortic arch anatomy is noted.  The  origins of the great vessels are widely patent. Atheromatous  plaque formation is noted within the bilateral carotid bulbs and  proximal internal carotid arteries, mild to moderate in severity  and slightly greater on the left. Vertebral arteries are  codominant.  No discrete neck mass is identified. There is mild  biapical subpleural scarring.      Impression:      1.  No significant intracranial arterial stenosis or occlusion.  2.  Atheromatous plaque formation within the bilateral carotid  bulbs and proximal internal carotid arteries, mild to moderate in  severity and slightly greater on the left.    Electronically signed by:  Albaro Mccall MD  10/3/2021 6:18 PM CDT  Workstation: [id number]    CT Angiogram Head w AI Analysis of LVO [[id number]] Collected: 10/03/21 1647     Updated: 10/03/21 1819    Narrative:      HISTORY:  Stroke, follow up    TECHNIQUE: Postcontrast angiographic imaging with 3-D  postprocessing imaging and multiplanar reformatted images of the  head and neck was performed utilizing 90 mL Isovue-370  intravenously.  NASCET criteria utilized.    CT angiography data was processed via RAPID LVO AI technology to  assist stroke clinical decision making with regards to the  diagnosis of large vessel occlusion.    This exam was performed according to our departmental  dose-optimization program, which includes automated exposure  control, adjustment of the mA and/or kV according to patient size  and/or use of iterative reconstruction technique.    COMPARISON: Examination is correlated with noncontrast head CT  performed immediately prior to this study.    FINDINGS:      CTA HEAD: There is mild atherosclerotic plaque within the  bilateral internal carotid arteries and bilateral vertebral  arteries without significant stenosis. There is no evidence of  significant intracranial arterial stenosis or  aneurysm.  There is  normal enhancement of the dural venous sinuses.    CTA NECK: Normal-appearing aortic arch anatomy is noted.  The  origins of the great vessels are widely patent. Atheromatous  plaque formation is noted within the bilateral carotid bulbs and  proximal internal carotid arteries, mild to moderate in severity  and slightly greater on the left. Vertebral arteries are  codominant.  No discrete neck mass is identified. There is mild  biapical subpleural scarring.      Impression:      1.  No significant intracranial arterial stenosis or occlusion.  2.  Atheromatous plaque formation within the bilateral carotid  bulbs and proximal internal carotid arteries, mild to moderate in  severity and slightly greater on the left.    Electronically signed by:  Albaro Mccall MD  10/3/2021 6:18 PM CDT  Workstation: [id number]    XR Chest 1 View [[id number]] Collected: 10/03/21 1700     Updated: 10/03/21 1731    Narrative:        PORTABLE CHEST    HISTORY: Stroke protocol onset greater than 12 hours. Speech  problem.    Portable AP upright film of the chest was obtained at 4:44 PM.  COMPARISON: None    FINDINGS:   EKG leads.  Old granulomatous disease is present.  Chronic obstructive pulmonary disease.  Minimal cardiomegaly with minimal congestive changes.  No pleural effusion.  No pneumothorax.  No acute osseous abnormality.  Degenerative changes are present in the thoracic spine.      Impression:      CONCLUSION:  Chronic obstructive pulmonary disease.  Minimal cardiomegaly with minimal congestive changes.    [id number]    Electronically signed by:  Johnny Alvarado MD  10/3/2021 5:29 PM CDT  Workstation: [id number]    CT Head Without Contrast Stroke Protocol [[id number]] Collected: 10/03/21 1640     Updated: 10/03/21 1706    Narrative:        CT Head Without Contrast    History: Stroke    Axial scans of the brain were obtained without intravenous  contrast.  Coronal and sagital reconstructions were preformed.    This exam was  performed according to our departmental  dose-optimization program, which includes automated exposure  control, adjustment of the mA and/or kV according to patient size  and/or use of iterative reconstruction technique.    DLP: 908.20    Comparison: None    Findings:  The patient is edentulous.  The visualized paranasal sinuses are unremarkable.    No acute process.  Cerebral and cerebellar atrophy.  No hemorrhage.  No mass.  No abnormal areas of increased or decreased attenuation.  No midline shift.  No abnormal extra-axial fluid collections.      Impression:      CONCLUSION:  No acute process.  Cerebral and cerebellar atrophy.    [id number]    Electronically signed by:  Johnny Alvarado MD  10/3/2021 5:04 PM CDT  Workstation: [id number]          Assessment / Plan       Hospital Problem List:    TIA versus possible CVA: Patient's symptom has resolved.  Admit for observation, begin neurochecks, continue DAPT, high intensity statin, check echocardiogram, bilateral carotid ultrasound and schedule patient for MRI in a.m.  Consult PT, OT and speech therapy.  Case was discussed with tele neurologist by the emergency room physician.  Assessment by the neurologist is pending.     Hypotension (likely medication induced): Blood pressure has improved following IV resuscitation.  Hold lisinopril and beta-blocker.    Acute kidney injury versus chronic kidney disease: There is no baseline creatinine on record.  Begin IV hydration, monitor renal function, avoid nephrotoxins and consult nephrologist if the need arises.    Depressive disorder: Resume Wellbutrin in a.m. post assessment by SLP.    Nicotine dependence: Nicotine patch was offered to the patient but he declined.  Nicotine cessation counseling has been discussed with the patient.    Begin GI and DVT prophylaxis.    Additional orders and treatment plan as hospital course dictates.    I confirmed that the patient's Advance Care Plan is present, code status is documented, or surrogate  decision maker is listed in the patient's medical record.     I have utilized all available immediate resources to obtain, update, or review the patient's current medications    I discussed the patient's findings and my recommendations with patient and his daughter.    Approximately 50 minutes of critical care time were spent managing the patient exclusive of billable procedures.          Kenny Covington MD  10/03/21  19:23 CDT      Dictated Utilizing Dragon Dictation

## 2021-10-04 NOTE — ED NOTES
Pt wants to leave AMA. Pt verbalized understanding of risk of leaving. Pt signed AMA form.     Carlos Gonzalez RN  10/04/21 0794

## 2021-10-04 NOTE — PROGRESS NOTES
MD at night:  I was informed by nursing staff that this patient wish to leave the hospital at 7 AM AGAINST MEDICAL ADVICE.    Patient was seen in his room.  Patient was resting comfortably in bed watching TV.  Patient does state that he knows what is wrong with him.  He stated he had 5 strokes in the past.  He stated that his doctor in this hospital is planning to do MRI of his brain, ultrasound from heart heart for him today.  He stated he has appointment tomorrow with his doctor in [place].  He states he wants to leave the hospital at 7 AM.    Patient is awake alert and oriented.  He does not show any signs symptoms of any altered mental status or impaired judgment. He does not have any suicidal, homicidal thoughts or ideation..  He knows why he is recommended to stay in the hospital.  He  knows the plan of care.  Patient is in no imminent danger to himself or others.  He has the capacity to make medical and mental treatment decisions.  I instructed patient about importance and necessity of remaining in the hospital for further work-up considering his admission for TIA CVA.  I instructed the patient about risk of leaving the hospital AGAINST MEDICAL ADVICE and benefit of remaining in the hospital for further work-up.    Patient was not receptive to my recommendations.  Patient  states that he leave the hospital at 7 AM AGAINST MEDICAL ADVICE. .Patient was instructed to avoid substance use in particular methamphetamine which may contribute and increases risk of stroke.  Patient was instructed to seek medical care as soon as possible, if he decide to leave the hospital AGAINST MEDICAL ADVICE.  Patient was instructed not to leave the hospital AGAINST MEDICAL ADVICE.  Patient was not receptive to my recommendation.

## 2021-10-04 NOTE — ED NOTES
This RN called to pt room after pt refusing to let  collect morning blood work. When this RN entered room pt states he is refusing all treatment from here forward and wants to sign out AMA. MD paged.     Alhaji Orozco RN  10/04/21 0624

## 2022-11-01 NOTE — ED NOTES
AAOx4. No c/o pain. Tolerating diabetic diet. NPO at midnight for bone scan and IR biopsy tomorrow. Lees brace applied to R arm. MRI brain performed today. Blood glucose monitoring maintained. Wife at bedside. Bed locked in lowest position, bed alarm set and call bell within reach.    Pt refusing to give urine sample, states he hasn't drank enough to pee. Refusing in and out catheter.      Mary Ace RN  10/03/21 1623